# Patient Record
Sex: FEMALE | Race: WHITE | NOT HISPANIC OR LATINO | URBAN - METROPOLITAN AREA
[De-identification: names, ages, dates, MRNs, and addresses within clinical notes are randomized per-mention and may not be internally consistent; named-entity substitution may affect disease eponyms.]

---

## 2024-02-22 ENCOUNTER — APPOINTMENT (RX ONLY)
Dept: URBAN - METROPOLITAN AREA CLINIC 141 | Facility: CLINIC | Age: 59
Setting detail: DERMATOLOGY
End: 2024-02-22

## 2024-02-22 DIAGNOSIS — Z80.8 FAMILY HISTORY OF MALIGNANT NEOPLASM OF OTHER ORGANS OR SYSTEMS: ICD-10-CM

## 2024-02-22 DIAGNOSIS — L57.8 OTHER SKIN CHANGES DUE TO CHRONIC EXPOSURE TO NONIONIZING RADIATION: ICD-10-CM | Status: STABLE

## 2024-02-22 DIAGNOSIS — L82.1 OTHER SEBORRHEIC KERATOSIS: ICD-10-CM

## 2024-02-22 DIAGNOSIS — Z71.89 OTHER SPECIFIED COUNSELING: ICD-10-CM

## 2024-02-22 DIAGNOSIS — L81.4 OTHER MELANIN HYPERPIGMENTATION: ICD-10-CM

## 2024-02-22 DIAGNOSIS — L72.11 PILAR CYST: ICD-10-CM

## 2024-02-22 DIAGNOSIS — D18.0 HEMANGIOMA: ICD-10-CM

## 2024-02-22 DIAGNOSIS — D22 MELANOCYTIC NEVI: ICD-10-CM

## 2024-02-22 PROBLEM — D22.5 MELANOCYTIC NEVI OF TRUNK: Status: ACTIVE | Noted: 2024-02-22

## 2024-02-22 PROBLEM — D18.01 HEMANGIOMA OF SKIN AND SUBCUTANEOUS TISSUE: Status: ACTIVE | Noted: 2024-02-22

## 2024-02-22 PROCEDURE — 99203 OFFICE O/P NEW LOW 30 MIN: CPT

## 2024-02-22 PROCEDURE — ? COUNSELING

## 2024-02-22 PROCEDURE — ? DEFER

## 2024-02-22 PROCEDURE — ? PHOTO-DOCUMENTATION

## 2024-02-22 ASSESSMENT — LOCATION SIMPLE DESCRIPTION DERM
LOCATION SIMPLE: SCALP
LOCATION SIMPLE: LEFT UPPER BACK
LOCATION SIMPLE: UPPER BACK
LOCATION SIMPLE: LEFT FOREHEAD
LOCATION SIMPLE: CHEST

## 2024-02-22 ASSESSMENT — LOCATION DETAILED DESCRIPTION DERM
LOCATION DETAILED: LEFT SUPERIOR MEDIAL FOREHEAD
LOCATION DETAILED: SUPERIOR THORACIC SPINE
LOCATION DETAILED: RIGHT LATERAL SUPERIOR CHEST
LOCATION DETAILED: LEFT MEDIAL UPPER BACK
LOCATION DETAILED: LEFT MEDIAL SUPERIOR CHEST
LOCATION DETAILED: LEFT SUPERIOR POSTAURICULAR SKIN

## 2024-02-22 ASSESSMENT — LOCATION ZONE DERM
LOCATION ZONE: TRUNK
LOCATION ZONE: FACE
LOCATION ZONE: SCALP

## 2024-02-22 NOTE — PROCEDURE: MIPS QUALITY
29-Jun-2020 16:38
Quality 226: Preventive Care And Screening: Tobacco Use: Screening And Cessation Intervention: Patient screened for tobacco use and is an ex/non-smoker
Detail Level: Detailed

## 2024-03-15 ENCOUNTER — APPOINTMENT (RX ONLY)
Dept: URBAN - METROPOLITAN AREA CLINIC 141 | Facility: CLINIC | Age: 59
Setting detail: DERMATOLOGY
End: 2024-03-15

## 2024-03-15 DIAGNOSIS — L72.11 PILAR CYST: ICD-10-CM

## 2024-03-15 PROBLEM — D48.5 NEOPLASM OF UNCERTAIN BEHAVIOR OF SKIN: Status: ACTIVE | Noted: 2024-03-15

## 2024-03-15 PROCEDURE — 11422 EXC H-F-NK-SP B9+MARG 1.1-2: CPT

## 2024-03-15 PROCEDURE — ? EXCISION

## 2024-03-15 PROCEDURE — 12031 INTMD RPR S/A/T/EXT 2.5 CM/<: CPT

## 2024-03-15 ASSESSMENT — LOCATION ZONE DERM: LOCATION ZONE: SCALP

## 2024-03-15 ASSESSMENT — LOCATION SIMPLE DESCRIPTION DERM: LOCATION SIMPLE: SCALP

## 2024-03-15 ASSESSMENT — LOCATION DETAILED DESCRIPTION DERM: LOCATION DETAILED: LEFT INFERIOR PARIETAL SCALP

## 2024-03-15 NOTE — PROCEDURE: EXCISION

## 2024-03-28 ENCOUNTER — APPOINTMENT (RX ONLY)
Dept: URBAN - METROPOLITAN AREA CLINIC 141 | Facility: CLINIC | Age: 59
Setting detail: DERMATOLOGY
End: 2024-03-28

## 2024-03-28 DIAGNOSIS — Z48.02 ENCOUNTER FOR REMOVAL OF SUTURES: ICD-10-CM

## 2024-03-28 PROCEDURE — ? SUTURE REMOVAL (GLOBAL PERIOD)

## 2024-03-28 ASSESSMENT — LOCATION ZONE DERM: LOCATION ZONE: SCALP

## 2024-03-28 ASSESSMENT — LOCATION DETAILED DESCRIPTION DERM: LOCATION DETAILED: LEFT INFERIOR PARIETAL SCALP

## 2024-03-28 ASSESSMENT — LOCATION SIMPLE DESCRIPTION DERM: LOCATION SIMPLE: SCALP

## 2024-03-28 NOTE — PROCEDURE: SUTURE REMOVAL (GLOBAL PERIOD)
Detail Level: Detailed
Add 21303 Cpt? (Important Note: In 2017 The Use Of 36890 Is Being Tracked By Cms To Determine Future Global Period Reimbursement For Global Periods): no

## 2025-02-20 ENCOUNTER — APPOINTMENT (OUTPATIENT)
Dept: URBAN - METROPOLITAN AREA CLINIC 141 | Facility: CLINIC | Age: 60
Setting detail: DERMATOLOGY
End: 2025-02-20

## 2025-02-20 DIAGNOSIS — Z71.89 OTHER SPECIFIED COUNSELING: ICD-10-CM

## 2025-02-20 DIAGNOSIS — L82.1 OTHER SEBORRHEIC KERATOSIS: ICD-10-CM

## 2025-02-20 DIAGNOSIS — L57.8 OTHER SKIN CHANGES DUE TO CHRONIC EXPOSURE TO NONIONIZING RADIATION: ICD-10-CM | Status: STABLE

## 2025-02-20 DIAGNOSIS — D22 MELANOCYTIC NEVI: ICD-10-CM

## 2025-02-20 DIAGNOSIS — D18.0 HEMANGIOMA: ICD-10-CM

## 2025-02-20 DIAGNOSIS — L81.4 OTHER MELANIN HYPERPIGMENTATION: ICD-10-CM

## 2025-02-20 DIAGNOSIS — L72.8 OTHER FOLLICULAR CYSTS OF THE SKIN AND SUBCUTANEOUS TISSUE: ICD-10-CM

## 2025-02-20 PROBLEM — D22.5 MELANOCYTIC NEVI OF TRUNK: Status: ACTIVE | Noted: 2025-02-20

## 2025-02-20 PROBLEM — D18.01 HEMANGIOMA OF SKIN AND SUBCUTANEOUS TISSUE: Status: ACTIVE | Noted: 2025-02-20

## 2025-02-20 PROCEDURE — ? COUNSELING

## 2025-02-20 PROCEDURE — 99213 OFFICE O/P EST LOW 20 MIN: CPT

## 2025-02-20 PROCEDURE — ? DEFER

## 2025-02-20 PROCEDURE — ? TREATMENT REGIMEN

## 2025-02-20 ASSESSMENT — LOCATION DETAILED DESCRIPTION DERM
LOCATION DETAILED: LEFT MEDIAL SUPERIOR CHEST
LOCATION DETAILED: SUPERIOR THORACIC SPINE
LOCATION DETAILED: LEFT MEDIAL UPPER BACK
LOCATION DETAILED: LEFT MEDIAL FRONTAL SCALP
LOCATION DETAILED: RIGHT LATERAL SUPERIOR CHEST
LOCATION DETAILED: RIGHT INFERIOR PARIETAL SCALP
LOCATION DETAILED: MID-FRONTAL SCALP

## 2025-02-20 ASSESSMENT — LOCATION ZONE DERM
LOCATION ZONE: SCALP
LOCATION ZONE: TRUNK

## 2025-02-20 ASSESSMENT — LOCATION SIMPLE DESCRIPTION DERM
LOCATION SIMPLE: LEFT SCALP
LOCATION SIMPLE: CHEST
LOCATION SIMPLE: SCALP
LOCATION SIMPLE: LEFT UPPER BACK
LOCATION SIMPLE: ANTERIOR SCALP
LOCATION SIMPLE: UPPER BACK

## 2025-02-20 NOTE — PROCEDURE: DEFER
Instructions (Optional): Patient can schedule a 30 min excision at her convenience.
Introduction Text (Please End With A Colon): .
X Size Of Lesion In Cm (Optional): 0
Detail Level: Detailed
Other Procedure: excision
Instructions (Optional): Patient can schedule a 30 min excision at her convenience

## 2025-02-20 NOTE — HPI: SKIN LESIONS
Is This A New Presentation, Or A Follow-Up?: Skin Lesions
Additional History: Patient wants to discuss removing the other 2 lesions.

## 2025-03-27 ENCOUNTER — APPOINTMENT (OUTPATIENT)
Dept: URBAN - METROPOLITAN AREA CLINIC 141 | Facility: CLINIC | Age: 60
Setting detail: DERMATOLOGY
End: 2025-03-27

## 2025-03-27 DIAGNOSIS — L72.8 OTHER FOLLICULAR CYSTS OF THE SKIN AND SUBCUTANEOUS TISSUE: ICD-10-CM

## 2025-03-27 PROBLEM — D48.5 NEOPLASM OF UNCERTAIN BEHAVIOR OF SKIN: Status: ACTIVE | Noted: 2025-03-27

## 2025-03-27 PROCEDURE — ? COUNSELING

## 2025-03-27 PROCEDURE — 11422 EXC H-F-NK-SP B9+MARG 1.1-2: CPT

## 2025-03-27 PROCEDURE — ? CPT CODE GENERATOR

## 2025-03-27 PROCEDURE — ? EXCISION

## 2025-03-27 PROCEDURE — 12031 INTMD RPR S/A/T/EXT 2.5 CM/<: CPT

## 2025-03-27 ASSESSMENT — LOCATION ZONE DERM: LOCATION ZONE: SCALP

## 2025-03-27 ASSESSMENT — LOCATION SIMPLE DESCRIPTION DERM: LOCATION SIMPLE: LEFT SCALP

## 2025-03-27 ASSESSMENT — LOCATION DETAILED DESCRIPTION DERM: LOCATION DETAILED: LEFT MEDIAL FRONTAL SCALP

## 2025-03-27 NOTE — PROCEDURE: CPT CODE GENERATOR
01612 Price: 0.00
Show Shave Removal Codes?: No
Anticipated Depth And Size Of Soft Tissue Excision (Required): Subcutaneous, Less than 1.5 cm
Anticipated Depth And Size Of Soft Tissue Excision (Required): Subcutaneous, Less than 2 cm
Which Photosensitizer Was Used?: Levulan
Anticipated Excised Diameter (Required): 1.1 - 2.0 cm
Number Of Lesions Injected: Less than 8 lesions
Anticipated Wound Length (Required): 2.5 cm or less
First Biopsy Type: Tangential Biopsy
Detail Level: None
Repair: Intermediate Primary Closure
Additional J Code Units: 1
How Many Lesions Are You Destroying?: Less than 15
Fee Schedule Discount In % Off Of Fee Schedule: Standard Fee Schedule as Entered in Pricing Tab
Anticipated Depth And Size Of Soft Tissue Excision (Required): Subcutaneous, Less than 3 cm
First Procedure You Would Like A Quote For?: Benign Excision
Location (Required): Scalp
Fee Schedule Discount For Cash Pay Patients In % Off Of Fee Schedule: 0

## 2025-03-27 NOTE — PROCEDURE: EXCISION

## 2025-04-08 ENCOUNTER — APPOINTMENT (OUTPATIENT)
Dept: URBAN - METROPOLITAN AREA CLINIC 141 | Facility: CLINIC | Age: 60
Setting detail: DERMATOLOGY
End: 2025-04-08

## 2025-04-08 DIAGNOSIS — Z48.02 ENCOUNTER FOR REMOVAL OF SUTURES: ICD-10-CM

## 2025-04-08 PROCEDURE — ? SUTURE REMOVAL (GLOBAL PERIOD)

## 2025-04-08 ASSESSMENT — LOCATION SIMPLE DESCRIPTION DERM: LOCATION SIMPLE: LEFT SCALP

## 2025-04-08 ASSESSMENT — LOCATION ZONE DERM: LOCATION ZONE: SCALP

## 2025-04-08 ASSESSMENT — LOCATION DETAILED DESCRIPTION DERM: LOCATION DETAILED: LEFT MEDIAL FRONTAL SCALP

## 2025-04-08 NOTE — PROCEDURE: SUTURE REMOVAL (GLOBAL PERIOD)
Detail Level: Detailed
Add 18501 Cpt? (Important Note: In 2017 The Use Of 24389 Is Being Tracked By Cms To Determine Future Global Period Reimbursement For Global Periods): no